# Patient Record
Sex: FEMALE | Race: WHITE
[De-identification: names, ages, dates, MRNs, and addresses within clinical notes are randomized per-mention and may not be internally consistent; named-entity substitution may affect disease eponyms.]

---

## 2018-06-12 ENCOUNTER — HOSPITAL ENCOUNTER (EMERGENCY)
Dept: HOSPITAL 75 - ER | Age: 48
Discharge: HOME | End: 2018-06-12
Payer: OTHER GOVERNMENT

## 2018-06-12 VITALS — SYSTOLIC BLOOD PRESSURE: 138 MMHG | DIASTOLIC BLOOD PRESSURE: 96 MMHG

## 2018-06-12 VITALS — HEIGHT: 63 IN | WEIGHT: 254 LBS | BODY MASS INDEX: 45 KG/M2

## 2018-06-12 DIAGNOSIS — K21.9: ICD-10-CM

## 2018-06-12 DIAGNOSIS — I10: ICD-10-CM

## 2018-06-12 DIAGNOSIS — Z87.19: ICD-10-CM

## 2018-06-12 DIAGNOSIS — Z87.891: ICD-10-CM

## 2018-06-12 DIAGNOSIS — S86.911A: Primary | ICD-10-CM

## 2018-06-12 LAB
ALBUMIN SERPL-MCNC: 4.1 GM/DL (ref 3.2–4.5)
ALP SERPL-CCNC: 67 U/L (ref 40–136)
ALT SERPL-CCNC: 28 U/L (ref 0–55)
BASOPHILS # BLD AUTO: 0 10^3/UL (ref 0–0.1)
BASOPHILS NFR BLD AUTO: 0 % (ref 0–10)
BILIRUB SERPL-MCNC: 0.6 MG/DL (ref 0.1–1)
BUN/CREAT SERPL: 14
CALCIUM SERPL-MCNC: 9 MG/DL (ref 8.5–10.1)
CHLORIDE SERPL-SCNC: 104 MMOL/L (ref 98–107)
CO2 SERPL-SCNC: 27 MMOL/L (ref 21–32)
CREAT SERPL-MCNC: 0.83 MG/DL (ref 0.6–1.3)
EOSINOPHIL # BLD AUTO: 0.1 10^3/UL (ref 0–0.3)
EOSINOPHIL NFR BLD AUTO: 1 % (ref 0–10)
ERYTHROCYTE [DISTWIDTH] IN BLOOD BY AUTOMATED COUNT: 12.8 % (ref 10–14.5)
GFR SERPLBLD BASED ON 1.73 SQ M-ARVRAT: > 60 ML/MIN
GLUCOSE SERPL-MCNC: 112 MG/DL (ref 70–105)
HCT VFR BLD CALC: 40 % (ref 35–52)
HGB BLD-MCNC: 14.3 G/DL (ref 11.5–16)
LYMPHOCYTES # BLD AUTO: 1.5 X 10^3 (ref 1–4)
LYMPHOCYTES NFR BLD AUTO: 18 % (ref 12–44)
MANUAL DIFFERENTIAL PERFORMED BLD QL: NO
MCH RBC QN AUTO: 33 PG (ref 25–34)
MCHC RBC AUTO-ENTMCNC: 36 G/DL (ref 32–36)
MCV RBC AUTO: 91 FL (ref 80–99)
MONOCYTES # BLD AUTO: 0.6 X 10^3 (ref 0–1)
MONOCYTES NFR BLD AUTO: 7 % (ref 0–12)
NEUTROPHILS # BLD AUTO: 6.1 X 10^3 (ref 1.8–7.8)
NEUTROPHILS NFR BLD AUTO: 73 % (ref 42–75)
PLATELET # BLD: 295 10^3/UL (ref 130–400)
PMV BLD AUTO: 9.9 FL (ref 7.4–10.4)
POTASSIUM SERPL-SCNC: 3.3 MMOL/L (ref 3.6–5)
PROT SERPL-MCNC: 7.2 GM/DL (ref 6.4–8.2)
RBC # BLD AUTO: 4.36 10^6/UL (ref 4.35–5.85)
SODIUM SERPL-SCNC: 141 MMOL/L (ref 135–145)
WBC # BLD AUTO: 8.4 10^3/UL (ref 4.3–11)

## 2018-06-12 PROCEDURE — 85025 COMPLETE CBC W/AUTO DIFF WBC: CPT

## 2018-06-12 PROCEDURE — 80053 COMPREHEN METABOLIC PANEL: CPT

## 2018-06-12 PROCEDURE — 36415 COLL VENOUS BLD VENIPUNCTURE: CPT

## 2018-06-12 PROCEDURE — 93005 ELECTROCARDIOGRAM TRACING: CPT

## 2018-06-12 PROCEDURE — 96360 HYDRATION IV INFUSION INIT: CPT

## 2018-06-12 NOTE — DIAGNOSTIC IMAGING REPORT
INDICATION: Right leg pain.



Right leg venous Doppler study was performed in the routine

fashion with color flow Doppler and waveform analysis.



FINDINGS: The right common femoral vein, superficial femoral

vein, popliteal vein and visualized portion of the tibial veins

show normal compressibility and venous flow patterns. There is

normal augmentation. 



IMPRESSION: No evidence of deep vein thrombosis of the major

veins of the right leg.



Dictated by: 



  Dictated on workstation # [id number]

## 2018-06-12 NOTE — ED GENERAL
General


Stated Complaint:  RIGHT CALF PAIN/DIZZINESS


Source of Information:  Patient


Exam Limitations:  No Limitations





History of Present Illness


Date Seen by Provider:  Jun 12, 2018


Time Seen by Provider:  14:03


Initial Comments


to ER with reports of pain to the posteriorproximal right calf. She initially 

presented to via [facility]. Upon standing from the waiting room to 

go back to the room she suddenly became very dizzy. This caused concern for the 

providers over there and presented her to the emergency room. She does not 

mention chest pain or shortness of breath. No history of this. She states that 

she has been out of her blood pressure medications for about 3 days. She 

reports "fogginess" in her head with persistent dizziness. On arrival to ER 

heart rate is 100, blood pressure 167/101.


Timing/Duration:  4-6 Hours


Severity:  Moderate


Associated Systoms:  No Nausea/Vomiting





Allergies and Home Medications


Allergies


Coded Allergies:  


     medroxyprogesterone acetate (Verified  Allergy, Unknown, 10/18/16)





Home Medications


Pantoprazole Sodium 40 Mg Tablet.dr, 40 MG PO DAILY


   Prescribed by: CAM STRATTON on 10/18/16 1137


Sucralfate 1 Gm/10 Ml Oral.susp, 1 GM PO QID


   Prescribed by: CAM STRATTON on 10/18/16 1137


Zolpidem Tartrate 10 Mg Tab, 10 MG PO HS, (Reported)


[B/P Medication]  , Unknown Dose PO DAILY, (Reported)





Patient Home Medication List


Home Medication List Reviewed:  Yes





Review of Systems


Constitutional:  see HPI


EENTM:  see HPI


Respiratory:  no symptoms reported


Cardiovascular:  see HPI; No Hx of Intervention, No palpitations, No syncope, 

No vascular heart diseas


Genitourinary:  no symptoms reported


Musculoskeletal:  no symptoms reported


Skin:  no symptoms reported


Psychiatric/Neurological:  No Symptoms Reported


Hematologic/Lymphatic:  No Symptoms Reported


Immunological/Allergic:  no symptoms reported





Past Medical-Social-Family Hx


Patient Social History


Alcohol Beverage of Choice:  Beer


Former Smoker, Quit:  Jan 1, 2000


Recent Foreign Travel:  No


Contact w/Someone Who Travel:  No


Recent Hopitalizations:  No





Seasonal Allergies


Seasonal Allergies:  Yes





Past Medical History


Chronic Edema/Swelling, Hypertension


Reproductive Disorders:  No


Gastroesophageal Reflux, Hiatal Hernia


Sleep Difficulties





Physical Exam


Vital Signs


Capillary Refill :


General Appearance:  No Apparent Distress, WD/WN, Obese


Eyes:  Bilateral Eye Normal Inspection, Bilateral Eye PERRL


HEENT:  PERRL/EOMI, TMs Normal


Neck:  Full Range of Motion, Normal Inspection


Respiratory:  Normal Breath Sounds, No Accessory Muscle Use, No Respiratory 

Distress


Cardiovascular:  Regular Rate, Rhythm, Normal Peripheral Pulses


Gastrointestinal:  Normal Bowel Sounds, Non Tender


Extremity:  Normal Capillary Refill, Normal Inspection


Neurologic/Psychiatric:  Alert, Oriented x3


Skin:  Normal Color, Warm/Dry (105)





Progress/Results/Core Measures


Suspected Sepsis


SIRS


Temperature: 


Pulse:  


Respiratory Rate: 


 


Laboratory Tests


6/12/18 14:08: White Blood Count 8.4


Blood Pressure  / 


Mean: 


 





Laboratory Tests


6/12/18 14:08: 


Creatinine 0.83, Platelet Count 295, Total Bilirubin 0.6








Results/Orders


Lab Results





Laboratory Tests








Test


 6/12/18


14:08 Range/Units


 


 


White Blood Count


 8.4 


 4.3-11.0


10^3/uL


 


Red Blood Count


 4.36 


 4.35-5.85


10^6/uL


 


Hemoglobin 14.3  11.5-16.0  G/DL


 


Hematocrit 40  35-52  %


 


Mean Corpuscular Volume 91  80-99  FL


 


Mean Corpuscular Hemoglobin 33  25-34  PG


 


Mean Corpuscular Hemoglobin


Concent 36 


 32-36  G/DL





 


Red Cell Distribution Width 12.8  10.0-14.5  %


 


Platelet Count


 295 


 130-400


10^3/uL


 


Mean Platelet Volume 9.9  7.4-10.4  FL


 


Neutrophils (%) (Auto) 73  42-75  %


 


Lymphocytes (%) (Auto) 18  12-44  %


 


Monocytes (%) (Auto) 7  0-12  %


 


Eosinophils (%) (Auto) 1  0-10  %


 


Basophils (%) (Auto) 0  0-10  %


 


Neutrophils # (Auto) 6.1  1.8-7.8  X 10^3


 


Lymphocytes # (Auto) 1.5  1.0-4.0  X 10^3


 


Monocytes # (Auto) 0.6  0.0-1.0  X 10^3


 


Eosinophils # (Auto)


 0.1 


 0.0-0.3


10^3/uL


 


Basophils # (Auto)


 0.0 


 0.0-0.1


10^3/uL


 


Sodium Level 141  135-145  MMOL/L


 


Potassium Level 3.3 L 3.6-5.0  MMOL/L


 


Chloride Level 104    MMOL/L


 


Carbon Dioxide Level 27  21-32  MMOL/L


 


Anion Gap 10  5-14  MMOL/L


 


Blood Urea Nitrogen 12  7-18  MG/DL


 


Creatinine


 0.83 


 0.60-1.30


MG/DL


 


Estimat Glomerular Filtration


Rate > 60 


  





 


BUN/Creatinine Ratio 14   


 


Glucose Level 112 H   MG/DL


 


Calcium Level 9.0  8.5-10.1  MG/DL


 


Total Bilirubin 0.6  0.1-1.0  MG/DL


 


Aspartate Amino Transf


(AST/SGOT) 16 


 5-34  U/L





 


Alanine Aminotransferase


(ALT/SGPT) 28 


 0-55  U/L





 


Alkaline Phosphatase 67    U/L


 


Total Protein 7.2  6.4-8.2  GM/DL


 


Albumin 4.1  3.2-4.5  GM/DL








My Orders





Orders - ROSALIE JIM


Cbc With Automated Diff (6/12/18 13:57)


Comprehensive Metabolic Panel (6/12/18 13:57)


Ekg Tracing (6/12/18 13:57)


Us Venous Lower Ext Lt (6/12/18 13:57)


Metoprolol Tartrate (Ir) Tab (Lopressor (6/12/18 14:00)


Ns Iv 1000 Ml (Sodium Chloride 0.9%) (6/12/18 14:00)


Iv Heplock-Insert (Order) (6/12/18 13:59)





Medications Given in ED





Current Medications








 Medications  Dose


 Ordered  Sig/Cara


 Route  Start Time


 Stop Time Status Last Admin


Dose Admin


 


 Metoprolol


 Tartrate  25 mg  ONCE  ONCE


 PO  6/12/18 14:00


 6/12/18 14:01 DC 6/12/18 14:22


25 MG








Vital Signs/I&O


Capillary Refill :





Departure


Communication (Admissions)


1230-still reports dizziness. She's only had about 100 mL of fluids. Her 

potassium was a bit low at 3.3 so replace that orally as this could contribute 

to the calf cramps. Ultrasound was negative for DVT per the ultrasound tech. 

She did just finish a seven-day course of Lasix about 5 days ago which could 

account for some volume depletion as evidenced by the orthostatic dizziness and 

tachycardia.





Impression





 Primary Impression:  


 Strain of calf muscle


 Additional Impressions:  


 Dizziness


 Hypertension


Disposition:  01 HOME, SELF-CARE


Condition:  Stable





Departure-Patient Inst.


Decision time for Depature:  14:56


Referrals:  


NO,LOCAL PHYSICIAN (PCP/Family)


Primary Care Physician


Patient Instructions:  Vertigo (a Type of Dizziness) (DC)





Add. Discharge Instructions:  


1. Return to ER for any concerns


2. Follow-up with your doctor within 1 week


3.











ROSALIE JIM Jun 12, 2018 14:04

## 2018-12-28 ENCOUNTER — HOSPITAL ENCOUNTER (OUTPATIENT)
Dept: HOSPITAL 75 - RAD | Age: 48
End: 2018-12-28
Attending: NURSE PRACTITIONER
Payer: OTHER GOVERNMENT
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
## 2018-12-28 DIAGNOSIS — M25.462: Primary | ICD-10-CM

## 2018-12-28 PROCEDURE — 73721 MRI JNT OF LWR EXTRE W/O DYE: CPT

## 2018-12-28 NOTE — DIAGNOSTIC IMAGING REPORT
PROCEDURE: MRI left joint lower extremity without contrast.



INDICATION: Left knee pain and swelling post fall.



TECHNIQUE: Multiplanar and multisequence noncontrast MR imaging

was performed of the left knee.



COMPARISON: None.



FINDINGS:

The knee coil was unable to be utilized. Therefore, this does

limit assessment of the intrinsic structures of the knee.



The posterior cruciate ligament is intact. A portion of the

anterior cruciate ligament appears to be visualized and overall

intact. There does appear to be some joint space narrowing in

both the medial and lateral compartments. Altered signal

intensity is suggested about the menisci likely reflective of

myxoid degenerative change. A definitive meniscal tear is not

otherwise suggested. Slight thinning of the articular cartilage

is noted. There is a very small subcortical cyst about the medial

tibial plateau. Collateral ligaments are maintained and

unremarkable. Patella is unremarkable with the quadriceps and

patellar tendons intact. There is presence of a small joint

effusion. Additionally, there is asymmetric soft tissue edema

anterior and inferior to the patella.



IMPRESSION:

1. Overall somewhat compromised and limited MRI of the left knee.

2. 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

change of the menisci without definitive evidence for meniscal

tear. No suggestion for ligament or tendon abnormality.

3. Small joint effusion as well as soft tissue edema,

particularly in the prepatellar and infrapatellar regions, which

may be reflective of underlying component of bursitis.



Dictated by: 



  Dictated on workstation # [id number]

## 2019-07-30 ENCOUNTER — HOSPITAL ENCOUNTER (OUTPATIENT)
Dept: HOSPITAL 75 - PREOP | Age: 49
Discharge: HOME | End: 2019-07-30
Attending: SURGERY
Payer: OTHER GOVERNMENT

## 2019-07-30 DIAGNOSIS — Z01.818: Primary | ICD-10-CM

## 2020-01-29 ENCOUNTER — HOSPITAL ENCOUNTER (OUTPATIENT)
Dept: HOSPITAL 75 - CARD | Age: 50
LOS: 88 days | Discharge: HOME | End: 2020-04-26
Attending: NURSE PRACTITIONER
Payer: OTHER GOVERNMENT

## 2020-01-29 DIAGNOSIS — I49.3: Primary | ICD-10-CM

## 2020-01-29 PROCEDURE — 93225 XTRNL ECG REC<48 HRS REC: CPT

## 2020-01-29 PROCEDURE — 93226 XTRNL ECG REC<48 HR SCAN A/R: CPT
